# Patient Record
Sex: MALE | Race: WHITE
[De-identification: names, ages, dates, MRNs, and addresses within clinical notes are randomized per-mention and may not be internally consistent; named-entity substitution may affect disease eponyms.]

---

## 2020-01-10 ENCOUNTER — HOSPITAL ENCOUNTER (OUTPATIENT)
Dept: HOSPITAL 47 - RADXRMAIN | Age: 55
Discharge: HOME | End: 2020-01-10
Attending: ORTHOPAEDIC SURGERY
Payer: COMMERCIAL

## 2020-01-10 DIAGNOSIS — Z98.890: ICD-10-CM

## 2020-01-10 DIAGNOSIS — Z01.818: Primary | ICD-10-CM

## 2020-01-10 PROCEDURE — 71046 X-RAY EXAM CHEST 2 VIEWS: CPT

## 2020-01-10 NOTE — XR
EXAMINATION TYPE: XR chest 2V

 

DATE OF EXAM: 1/10/2020

 

COMPARISON: NONE

 

HISTORY: Prior pectus surgery, pre-MRI clearance.

 

TECHNIQUE:  Frontal and lateral views of the chest are obtained.

 

FINDINGS: Pectus excavatum deformity.  There is no focal air space opacity, pleural effusion, or pneu
mothorax seen.  The cardiac silhouette size is enlarged.   The osseous structures are intact.

 

IMPRESSION:  No suspicious metallic or intracardiac foreign body to prevent MRI study.

## 2020-02-13 ENCOUNTER — HOSPITAL ENCOUNTER (OUTPATIENT)
Dept: HOSPITAL 47 - LABPAT | Age: 55
Discharge: HOME | End: 2020-02-13
Attending: ORTHOPAEDIC SURGERY
Payer: COMMERCIAL

## 2020-02-13 DIAGNOSIS — Z01.818: Primary | ICD-10-CM

## 2020-02-13 DIAGNOSIS — Z01.812: ICD-10-CM

## 2020-02-13 LAB
ALBUMIN SERPL-MCNC: 4.4 G/DL (ref 3.5–5)
ALP SERPL-CCNC: 62 U/L (ref 38–126)
ALT SERPL-CCNC: 52 U/L (ref 4–49)
ANION GAP SERPL CALC-SCNC: 8 MMOL/L
APTT BLD: 24.9 SEC (ref 22–30)
AST SERPL-CCNC: 48 U/L (ref 17–59)
BUN SERPL-SCNC: 17 MG/DL (ref 9–20)
CALCIUM SPEC-MCNC: 9.3 MG/DL (ref 8.4–10.2)
CHLORIDE SERPL-SCNC: 97 MMOL/L (ref 98–107)
CO2 SERPL-SCNC: 31 MMOL/L (ref 22–30)
ERYTHROCYTE [DISTWIDTH] IN BLOOD BY AUTOMATED COUNT: 5.46 M/UL (ref 4.3–5.9)
ERYTHROCYTE [DISTWIDTH] IN BLOOD: 13.3 % (ref 11.5–15.5)
GLUCOSE SERPL-MCNC: 90 MG/DL (ref 74–99)
HCT VFR BLD AUTO: 49.8 % (ref 39–53)
HGB BLD-MCNC: 17.1 GM/DL (ref 13–17.5)
INR PPP: 0.9 (ref ?–1.2)
MCH RBC QN AUTO: 31.3 PG (ref 25–35)
MCHC RBC AUTO-ENTMCNC: 34.3 G/DL (ref 31–37)
MCV RBC AUTO: 91.2 FL (ref 80–100)
PH UR: 6.5 [PH] (ref 5–8)
PLATELET # BLD AUTO: 142 K/UL (ref 150–450)
POTASSIUM SERPL-SCNC: 4.5 MMOL/L (ref 3.5–5.1)
PROT SERPL-MCNC: 7.6 G/DL (ref 6.3–8.2)
PT BLD: 9.8 SEC (ref 9–12)
SODIUM SERPL-SCNC: 136 MMOL/L (ref 137–145)
SP GR UR: 1.01 (ref 1–1.03)
UROBILINOGEN UR QL STRIP: <2 MG/DL (ref ?–2)
WBC # BLD AUTO: 4.8 K/UL (ref 3.8–10.6)

## 2020-02-13 PROCEDURE — 81003 URINALYSIS AUTO W/O SCOPE: CPT

## 2020-02-13 PROCEDURE — 85610 PROTHROMBIN TIME: CPT

## 2020-02-13 PROCEDURE — 80053 COMPREHEN METABOLIC PANEL: CPT

## 2020-02-13 PROCEDURE — 93005 ELECTROCARDIOGRAM TRACING: CPT

## 2020-02-13 PROCEDURE — 85027 COMPLETE CBC AUTOMATED: CPT

## 2020-02-13 PROCEDURE — 36415 COLL VENOUS BLD VENIPUNCTURE: CPT

## 2020-02-13 PROCEDURE — 85730 THROMBOPLASTIN TIME PARTIAL: CPT

## 2020-02-13 PROCEDURE — 87070 CULTURE OTHR SPECIMN AEROBIC: CPT

## 2020-02-25 ENCOUNTER — HOSPITAL ENCOUNTER (OUTPATIENT)
Dept: HOSPITAL 47 - OR | Age: 55
LOS: 1 days | Discharge: HOME | End: 2020-02-26
Attending: ORTHOPAEDIC SURGERY
Payer: COMMERCIAL

## 2020-02-25 VITALS — BODY MASS INDEX: 27.8 KG/M2

## 2020-02-25 DIAGNOSIS — M17.11: Primary | ICD-10-CM

## 2020-02-25 DIAGNOSIS — Z97.3: ICD-10-CM

## 2020-02-25 DIAGNOSIS — Z83.3: ICD-10-CM

## 2020-02-25 DIAGNOSIS — M10.9: ICD-10-CM

## 2020-02-25 DIAGNOSIS — Z88.0: ICD-10-CM

## 2020-02-25 DIAGNOSIS — Z88.1: ICD-10-CM

## 2020-02-25 PROCEDURE — 97161 PT EVAL LOW COMPLEX 20 MIN: CPT

## 2020-02-25 PROCEDURE — 27447 TOTAL KNEE ARTHROPLASTY: CPT

## 2020-02-25 PROCEDURE — 85025 COMPLETE CBC W/AUTO DIFF WBC: CPT

## 2020-02-25 PROCEDURE — 97116 GAIT TRAINING THERAPY: CPT

## 2020-02-25 PROCEDURE — 88300 SURGICAL PATH GROSS: CPT

## 2020-02-25 PROCEDURE — 76942 ECHO GUIDE FOR BIOPSY: CPT

## 2020-02-25 PROCEDURE — 64448 NJX AA&/STRD FEM NRV NFS IMG: CPT

## 2020-02-25 PROCEDURE — 73560 X-RAY EXAM OF KNEE 1 OR 2: CPT

## 2020-02-25 RX ADMIN — POTASSIUM CHLORIDE SCH MLS/HR: 14.9 INJECTION, SOLUTION INTRAVENOUS at 20:54

## 2020-02-25 RX ADMIN — POTASSIUM CHLORIDE SCH MLS: 14.9 INJECTION, SOLUTION INTRAVENOUS at 07:40

## 2020-02-25 RX ADMIN — SODIUM CHLORIDE ONE MG: 9 INJECTION, SOLUTION INTRAVENOUS at 10:16

## 2020-02-25 RX ADMIN — POTASSIUM CHLORIDE SCH: 14.9 INJECTION, SOLUTION INTRAVENOUS at 13:54

## 2020-02-25 RX ADMIN — HYDROCODONE BITARTRATE AND ACETAMINOPHEN PRN EACH: 5; 325 TABLET ORAL at 16:45

## 2020-02-25 RX ADMIN — HYDROCODONE BITARTRATE AND ACETAMINOPHEN PRN EACH: 5; 325 TABLET ORAL at 23:35

## 2020-02-25 RX ADMIN — SODIUM CHLORIDE ONE MG: 9 INJECTION, SOLUTION INTRAVENOUS at 09:44

## 2020-02-25 RX ADMIN — SODIUM CHLORIDE ONE MG: 9 INJECTION, SOLUTION INTRAVENOUS at 09:18

## 2020-02-25 RX ADMIN — ASPIRIN 325 MG ORAL TABLET SCH MG: 325 PILL ORAL at 20:53

## 2020-02-25 NOTE — P.ANPRN
Procedure Note - Anesthesia





- Nerve Block Performed


  ** Right Adductor Canal Infusion


Time Out Performed: Yes


Date of Procedure: 02/25/20


Procedure Start Time: 08:15


Procedure Stop Time: 08:22


Location of Patient: PreOp


Indication: Acute Post-Operative Pain


Specifically requested for management of pain by DrDionicio: Zhao Guy


Sedation Type: Sedate with meaningful contact maintained


Preparation: Sterile Prep


Position: Supine


Catheter Depth at Skin (cm): 7


Catheter: Indwelling


Needle Types: Pajunk


Needle Gauge: 18


Ultrasound used to visualize needle placement: Yes


Ultrasound used to observe medication spread: Yes


Injectate: 0.5% Ropivacaine (see comment for volume) (20 cc)


Blood Aspirated: No


Pain Paresthesia on Injection Noted: No


Resistance on Injection: Normal


Image Stored and Saved: Yes


Events: Uneventful and Well Tolerated

## 2020-02-25 NOTE — P.OP
Date of Procedure: 02/25/20


Preoperative Diagnosis: 


Severe osteoarthritis right knee


Postoperative Diagnosis: 


Severe osteoarthritis right knee


Procedure(s) Performed: 


Right total knee arthroplasty using [employer] patient specific guides


Implants: 


Smith and Nephew Cruciate Retaining Journey II CR Oxinium Femoral Component size

8, right


Smith & Nephew Journey Nonporous Tibial Baseplate size 6, right


Smith & Nephew Journey II CR, XLPE Articular Insert, 9 mm, size 5-6


Smith & Nephew Marjan II Resurfacing Patellar Component, Oval,  32 mm


All components were cemented using Palacose R bone cement.


The articulation is Oxinium on polyethylene.


Visionaire patient specific guides


The articulation is Oxinium on polyethylene.


Anesthesia: spinal


Surgeon: Zhao Guy


Assistant #1: Chely Brock


Estimated Blood Loss (ml): 25


Pathology: other (Bone and cartilage)


Condition: stable


Disposition: PACU


Indications for Procedure: 


After failure of conservative treatment we discussed the surgical and 

nonsurgical treatment options at length.  Patient wishes to proceed with a total

knee arthroplasty.  Complications specific to this procedure were discussed at 

length, including but not limited to infection, bleeding, stiffness, and nerve 

injury.  Patient is aware of all these complications and informed consent was 

obtained


Operative Findings: 


The operative findings are consistent with severe osteoarthritis of the right 

knee


Description of Procedure: 


Patient was seen in the preoperative area consent was reviewed and operative 

site was marked with a skin marker.  An adductor canal pain catheter was placed 

by anesthesia in the preoperative area.  Patient was then brought to the 

operating room and given preoperative antibiotics intravenously. A spinal 

anesthetic was administered by the anesthesia department.  A tourniquet was 

placed on the upper thigh and the lower extremity was prepped and draped in 

usual sterile fashion.  A gram of transexamic acid was given.  A universal ti

meout was then performed which confirmed the patient's name, surgical site, 

ALLERGIES, and consent.





The lower extremity was then exsanguinated and tourniquet was inflated to 250 

mmHg.  A standard and anterior midline approach to the knee was performed.  The 

skin and subcutaneous tissue was dissected down to the patellar tendon.  A 

medial parapatellar arthrotomy was then performed.  The knee was then extended, 

the patellar was everted, and the knee was again flexed.  Anterior horns of both

menisci were excised, and a release was performed to the posterior medial aspect

of the knee.  On gross visual inspection, there was complete loss of articular 

cartilage in the medial and patellofemoral joint spaces.  There was also 

significant cartilage damage in the lateral compartment.  There were multiple 

periarticular osteophytes.  The patient specific guide was placed on the distal 

femur, and pinned in place.  Using the patient specific guide, the distal 

femoral cut was performed.  The cutting block was then removed and the cut was 

checked for flatness.  The appropriate 5-in-1 cutting block was then pinned in 

place through the holes that were drilled through the patient specific guide.  

The anterior condyles were cut without notching.  The posterior and chamfer cuts

were performed while protecting the collateral ligaments.  The cutting block was

then removed.





Attention was then directed to the tibia.  The remaining ACL was removed with a 

Ronguer, and the tibia was then gently subluxed forward with a large bent knee 

retractor.  Any remaining menisci was excised.  The posterior lateral corner was

cauterized in order to cauterize the lateral geniculate artery.  The patient 

specific guide for the tibia was then placed and was held in place with pins.  

Pinholes were then placed for rotation of the tibial component as well.  

Proximal tibia was then cut and sized.  Next trials were then placed with the 

appropriate-sized insert.  The knee was able to fully extend and flex to 130 a

nd was stable throughout all range of motion.  The knee was then extended, 

patella everted.  Patella was then measured, and then using an osteotomy guide, 

the patella was cut at the appropriate level.  The patella was then measured and

drilled and the patella trial was then placed.  The knee was then taken through 

range of motion with the patella trial and the patella tracked normally.  The 

knee was then extended patella trial was then removed and the patella was 

everted.  Knee was then flexed and lug holes were drilled through the femoral 

trial and the femoral trial was then removed.  The tibial was then exposed, and 

the tibial broach guide was then pinned in place after it was set for the 

appropriate rotation to allow for the most coverage without overhang.  The tibia

was then reamed and broached.





The cut surfaces of bone were then irrigated with pulsatile lavage.  The 

posterior structures were injected with the ropivacaine solution.  The knee was 

also irrigated with Irrisept solution.  The components were then opened, the 

cement was mixed, and the components were then cemented in place.  The cement 

was allowed to harden with the knee in full extension.  While the cement was abilio

dening, the remaining soft tissues were then injected with a ropivacaine 

solution, which consisted of 246.25 mg of ropivacaine, 0.5 mg of epinephrine, 30

mg of Toradol, 80 g of clonidine, and 48.45 mL of sterile water, for a total of

100 mL of fluid injected. After the cemented hardened.  The tourniquet was 

released, and hemostasis was obtained.  A second gram of transexamic acid was 

given.  The knee was again irrigated.  The knee was again taken through range of

motion and found to be stable throughout all range of motion of 0-130, and the 

patella tracked normally.  The fascia was then closed with #2 strata fix suture.

 The subcutaneous tissue was closed with 3-0 Vicryl and 3-0 strata fix.  

Dermabond glue was used for the skin and placed with the knee in flexion. The 

patient was placed in a sterile silver dressing.  Patient was then transferred 

to recovery room in stable condition.





The assistant JUAN A Simpson was required due the complexity surgery and the 

need for a skilled surgical assistant.  She assisted in positioning, draping, 

retraction, and closure of the wound.

## 2020-02-25 NOTE — XR
EXAMINATION TYPE: XR knee limited RT

 

DATE OF EXAM: 2/25/2020

 

CLINICAL HISTORY: Right knee pain and arthritis status post total knee replacement.

 

TECHNIQUE:  Portable AP and crosstable lateral views of the right knee are obtained immediately posto
peratively.

 

COMPARISON: None

 

FINDINGS:  Metallic hardware from total right knee arthroplasty is seen and appears satisfactory in a
lignment and position.  There is evidence of recent surgery with diffuse subcutaneous and soft tissue
 swelling noted. 

 

 

IMPRESSION:  METALLIC HARDWARE FROM TOTAL RIGHT KNEE ARTHROPLASTY IS SATISFACTORY IN ALIGNMENT.

## 2020-02-26 VITALS
TEMPERATURE: 98.3 F | HEART RATE: 70 BPM | DIASTOLIC BLOOD PRESSURE: 68 MMHG | SYSTOLIC BLOOD PRESSURE: 128 MMHG | RESPIRATION RATE: 17 BRPM

## 2020-02-26 LAB
BASOPHILS # BLD AUTO: 0 K/UL (ref 0–0.2)
BASOPHILS NFR BLD AUTO: 0 %
EOSINOPHIL # BLD AUTO: 0 K/UL (ref 0–0.7)
EOSINOPHIL NFR BLD AUTO: 0 %
ERYTHROCYTE [DISTWIDTH] IN BLOOD BY AUTOMATED COUNT: 4.83 M/UL (ref 4.3–5.9)
ERYTHROCYTE [DISTWIDTH] IN BLOOD: 13 % (ref 11.5–15.5)
HCT VFR BLD AUTO: 43.6 % (ref 39–53)
HGB BLD-MCNC: 15.2 GM/DL (ref 13–17.5)
LYMPHOCYTES # SPEC AUTO: 1.4 K/UL (ref 1–4.8)
LYMPHOCYTES NFR SPEC AUTO: 13 %
MCH RBC QN AUTO: 31.4 PG (ref 25–35)
MCHC RBC AUTO-ENTMCNC: 34.8 G/DL (ref 31–37)
MCV RBC AUTO: 90.3 FL (ref 80–100)
MONOCYTES # BLD AUTO: 0.5 K/UL (ref 0–1)
MONOCYTES NFR BLD AUTO: 5 %
NEUTROPHILS # BLD AUTO: 9.2 K/UL (ref 1.3–7.7)
NEUTROPHILS NFR BLD AUTO: 82 %
PLATELET # BLD AUTO: 155 K/UL (ref 150–450)
WBC # BLD AUTO: 11.3 K/UL (ref 3.8–10.6)

## 2020-02-26 RX ADMIN — POTASSIUM CHLORIDE SCH: 14.9 INJECTION, SOLUTION INTRAVENOUS at 05:19

## 2020-02-26 RX ADMIN — POTASSIUM CHLORIDE SCH MLS/HR: 14.9 INJECTION, SOLUTION INTRAVENOUS at 01:12

## 2020-02-26 RX ADMIN — ASPIRIN 325 MG ORAL TABLET SCH MG: 325 PILL ORAL at 07:13

## 2020-02-26 NOTE — P.DS
Providers


Expected date of discharge: 02/26/20


Attending physician: 


Zhao Guy





Primary care physician: 


Rizwan Leroy








- Discharge Diagnosis(es)


(1) Osteoarthritis of right knee


Current Visit: Yes   Status: Acute   





(2) Status post total right knee replacement


Current Visit: Yes   Status: Acute   


Hospital Course: 





This is a pleasant 54-year-old male last seen in our office with complaints of 

right knee pain.  Patient has known history of degenerative arthritis of the 

right knee and presented to discuss options.  After discussion and 

consideration, patient elected to proceed with a total knee arthroplasty of the 

right knee.  The patient was seen preoperatively and medically cleared for 

surgery by Dr. Rizwan Leroy.





The patient was admitted to [facility] and underwent right total knee 

arthroplasty on 2/25/2020 with Dr. Zhao Guy.  The procedure was performed 

without complications or sequelae.  The patient has done well postoperatively.  

The patient was seen and evaluated at bedside today and denies any new 

complaints.  Pain is reasonably controlled.  Dressing is clean dry and intact.  

Incision looks fine with no erythema or active drainage.  Calf is soft and 

nontender.  The patient has full foot and ankle motion without difficulty.  

Patient's right lower extremity is neurovascular intact.  





Patient is orthopedically stable for discharge to home today.


Pertinent Studies: 





                                Laboratory Tests











  02/26/20





  07:47


 


WBC  11.3 H


 


RBC  4.83


 


Hgb  15.2


 


Hct  43.6


 


Neutrophils #  9.2 H











Patient Condition at Discharge: Stable





Plan - Discharge Summary


Discharge Rx Participant: Yes


New Discharge Prescriptions: 


New


   Aspirin 325 mg PO BID #60 tab


   Sennosides-Docusate Sodium [Senokot-S] 2 tab PO DAILY #30 tablet


   HYDROcodone/APAP 5-325MG [Norco 5] 1 - 2 each PO Q4-6H PRN #56 tab


     PRN Reason: Pain





No Action


   Naproxen Sodium [Aleve] 220 mg PO BID PRN


     PRN Reason: Pain


Discharge Medication List





Naproxen Sodium [Aleve] 220 mg PO BID PRN 02/19/20 [History]


Aspirin 325 mg PO BID #60 tab 02/25/20 [Rx]


Sennosides-Docusate Sodium [Senokot-S] 2 tab PO DAILY #30 tablet 02/25/20 [Rx]


HYDROcodone/APAP 5-325MG [Norco 5] 1 - 2 each PO Q4-6H PRN #56 tab 02/26/20 [Rx]








Follow up Appointment(s)/Referral(s): 


Zhao Guy DO [Doctor of Osteopathic Medicine] - 2 Weeks


Ambulatory/Diagnostic Orders: 


Continuous Passive Motion (CPM) Machine [DME.AMB1] Time Frame: 3 Weeks, 

Location: None Selected


Patient Instructions/Handouts:  *Surgery [facility] - On-Q Pain Pump Discharge 

Instructions, Knee Replacement (DC)


Activity/Diet/Wound Care/Special Instructions: 


Weightbearing as tolerated with walker


Keep dressing in place for 10 days unless saturated


Aspirin 325 mg twice a day


May shower over dressing


CPM 5-6 hours daily


Follow up with Dr. Zhao Guy in 2 weeks.





Call [facility] with questions or concerns, [phone]


Discharge Disposition: HOME SELF-CARE

## 2020-02-26 NOTE — P.PN
Progress Note - Text





2/26   640am


54 year old male tkr by Dr alberto gaitan. pt has on - q pump for post op pain 

control with the solution running at 8cc/hr with a vas of 2. Plan to continue 

on-q pump infusion.

## 2021-06-21 ENCOUNTER — HOSPITAL ENCOUNTER (OUTPATIENT)
Dept: HOSPITAL 47 - LABWHC1 | Age: 56
Discharge: HOME | End: 2021-06-21
Attending: ORTHOPAEDIC SURGERY
Payer: COMMERCIAL

## 2021-06-21 DIAGNOSIS — M54.6: ICD-10-CM

## 2021-06-21 DIAGNOSIS — Z01.818: Primary | ICD-10-CM

## 2021-06-21 DIAGNOSIS — M50.320: ICD-10-CM

## 2021-06-21 DIAGNOSIS — R00.1: ICD-10-CM

## 2021-06-21 DIAGNOSIS — M50.223: ICD-10-CM

## 2021-06-21 DIAGNOSIS — M54.12: ICD-10-CM

## 2021-06-21 LAB
ANION GAP SERPL CALC-SCNC: 9.8 MMOL/L (ref 4–12)
APTT BLD: 24.9 SEC (ref 22–30)
BUN SERPL-SCNC: 17 MG/DL (ref 9–27)
BUN/CREAT SERPL: 17 RATIO (ref 12–20)
CALCIUM SPEC-MCNC: 9.2 MG/DL (ref 8.7–10.3)
CHLORIDE SERPL-SCNC: 99 MMOL/L (ref 96–109)
CO2 SERPL-SCNC: 28.2 MMOL/L (ref 21.6–31.8)
ERYTHROCYTE [DISTWIDTH] IN BLOOD BY AUTOMATED COUNT: 5.24 X 10*6/UL (ref 4.4–5.6)
ERYTHROCYTE [DISTWIDTH] IN BLOOD: 13.3 % (ref 11.5–14.5)
GLUCOSE SERPL-MCNC: 112 MG/DL (ref 70–110)
HCT VFR BLD AUTO: 47.9 % (ref 39.6–50)
HGB BLD-MCNC: 16.6 G/DL (ref 13–17)
INR PPP: 1 (ref ?–1.2)
MCH RBC QN AUTO: 31.7 PG (ref 27–32)
MCHC RBC AUTO-ENTMCNC: 34.7 G/DL (ref 32–37)
MCV RBC AUTO: 91.4 FL (ref 80–97)
PH UR: 5.5 [PH] (ref 5–8)
PLATELET # BLD AUTO: 172 X 10*3/UL (ref 140–440)
POTASSIUM SERPL-SCNC: 4.4 MMOL/L (ref 3.5–5.5)
PT BLD: 10.7 SEC (ref 9–12)
SODIUM SERPL-SCNC: 137 MMOL/L (ref 135–145)
SP GR UR: 1.01 (ref 1–1.03)
UROBILINOGEN UR QL STRIP: <2 MG/DL (ref ?–2)
WBC # BLD AUTO: 4.59 X 10*3/UL (ref 4.5–10)

## 2021-06-21 PROCEDURE — 81003 URINALYSIS AUTO W/O SCOPE: CPT

## 2021-06-21 PROCEDURE — 93005 ELECTROCARDIOGRAM TRACING: CPT

## 2021-06-21 PROCEDURE — 85027 COMPLETE CBC AUTOMATED: CPT

## 2021-06-21 PROCEDURE — 36415 COLL VENOUS BLD VENIPUNCTURE: CPT

## 2021-06-21 PROCEDURE — 71046 X-RAY EXAM CHEST 2 VIEWS: CPT

## 2021-06-21 PROCEDURE — 85610 PROTHROMBIN TIME: CPT

## 2021-06-21 PROCEDURE — 85730 THROMBOPLASTIN TIME PARTIAL: CPT

## 2021-06-21 PROCEDURE — 80048 BASIC METABOLIC PNL TOTAL CA: CPT

## 2021-06-21 NOTE — XR
EXAMINATION TYPE: XR chest 2V

 

DATE OF EXAM: 6/21/2021

 

COMPARISON: 1/10/2020

 

HISTORY: Preoperative

 

TECHNIQUE:  Frontal and lateral views of the chest are obtained.

 

FINDINGS:  There is no focal air space opacity, pleural effusion, or pneumothorax seen.  The cardiac 
silhouette size is within normal limits.   The osseous structures are intact.

 

IMPRESSION:  No acute cardiopulmonary process.